# Patient Record
Sex: FEMALE | Race: WHITE | Employment: OTHER | URBAN - METROPOLITAN AREA
[De-identification: names, ages, dates, MRNs, and addresses within clinical notes are randomized per-mention and may not be internally consistent; named-entity substitution may affect disease eponyms.]

---

## 2017-01-26 DIAGNOSIS — I10 ESSENTIAL HYPERTENSION: ICD-10-CM

## 2017-01-26 RX ORDER — AMLODIPINE BESYLATE 5 MG/1
TABLET ORAL
Qty: 180 TABLET | Refills: 2 | Status: SHIPPED | OUTPATIENT
Start: 2017-01-26

## 2017-02-24 RX ORDER — OMEPRAZOLE 20 MG/1
CAPSULE, DELAYED RELEASE ORAL
Qty: 90 CAPSULE | Refills: 2 | Status: SHIPPED | OUTPATIENT
Start: 2017-02-24

## 2017-02-24 RX ORDER — HYDROCHLOROTHIAZIDE 25 MG/1
TABLET ORAL
Qty: 90 TABLET | Refills: 2 | Status: SHIPPED | OUTPATIENT
Start: 2017-02-24

## 2018-10-11 ENCOUNTER — OFFICE VISIT (OUTPATIENT)
Dept: ORTHOPEDIC SURGERY | Age: 83
End: 2018-10-11
Payer: MEDICARE

## 2018-10-11 DIAGNOSIS — Z96.651 STATUS POST TOTAL RIGHT KNEE REPLACEMENT: ICD-10-CM

## 2018-10-11 DIAGNOSIS — M25.561 ACUTE PAIN OF RIGHT KNEE: Primary | ICD-10-CM

## 2018-10-11 PROCEDURE — 1090F PRES/ABSN URINE INCON ASSESS: CPT | Performed by: PHYSICIAN ASSISTANT

## 2018-10-11 PROCEDURE — 1101F PT FALLS ASSESS-DOCD LE1/YR: CPT | Performed by: PHYSICIAN ASSISTANT

## 2018-10-11 PROCEDURE — G8419 CALC BMI OUT NRM PARAM NOF/U: HCPCS | Performed by: PHYSICIAN ASSISTANT

## 2018-10-11 PROCEDURE — 1123F ACP DISCUSS/DSCN MKR DOCD: CPT | Performed by: PHYSICIAN ASSISTANT

## 2018-10-11 PROCEDURE — 99213 OFFICE O/P EST LOW 20 MIN: CPT | Performed by: PHYSICIAN ASSISTANT

## 2018-10-11 PROCEDURE — G8484 FLU IMMUNIZE NO ADMIN: HCPCS | Performed by: PHYSICIAN ASSISTANT

## 2018-10-11 PROCEDURE — 4040F PNEUMOC VAC/ADMIN/RCVD: CPT | Performed by: PHYSICIAN ASSISTANT

## 2018-10-11 PROCEDURE — 1036F TOBACCO NON-USER: CPT | Performed by: PHYSICIAN ASSISTANT

## 2018-10-11 PROCEDURE — G8427 DOCREV CUR MEDS BY ELIG CLIN: HCPCS | Performed by: PHYSICIAN ASSISTANT

## 2018-10-16 VITALS
HEIGHT: 61 IN | DIASTOLIC BLOOD PRESSURE: 77 MMHG | TEMPERATURE: 98.1 F | BODY MASS INDEX: 27.56 KG/M2 | RESPIRATION RATE: 16 BRPM | WEIGHT: 146 LBS | SYSTOLIC BLOOD PRESSURE: 164 MMHG | HEART RATE: 60 BPM

## 2021-03-25 ENCOUNTER — OFFICE VISIT (OUTPATIENT)
Dept: ORTHOPEDIC SURGERY | Age: 86
End: 2021-03-25
Payer: MEDICARE

## 2021-03-25 ENCOUNTER — TELEPHONE (OUTPATIENT)
Dept: ORTHOPEDIC SURGERY | Age: 86
End: 2021-03-25

## 2021-03-25 VITALS — TEMPERATURE: 97 F | HEIGHT: 61 IN | BODY MASS INDEX: 26.81 KG/M2 | WEIGHT: 142 LBS

## 2021-03-25 DIAGNOSIS — S82.031A CLOSED DISPLACED TRANSVERSE FRACTURE OF RIGHT PATELLA, INITIAL ENCOUNTER: ICD-10-CM

## 2021-03-25 DIAGNOSIS — Z96.651 STATUS POST TOTAL RIGHT KNEE REPLACEMENT: Primary | ICD-10-CM

## 2021-03-25 DIAGNOSIS — Z96.642 H/O TOTAL HIP ARTHROPLASTY, LEFT: ICD-10-CM

## 2021-03-25 PROCEDURE — 99213 OFFICE O/P EST LOW 20 MIN: CPT | Performed by: ORTHOPAEDIC SURGERY

## 2021-03-25 PROCEDURE — G8417 CALC BMI ABV UP PARAM F/U: HCPCS | Performed by: ORTHOPAEDIC SURGERY

## 2021-03-25 PROCEDURE — L1832 KO ADJ JNT POS R SUP PRE CST: HCPCS | Performed by: ORTHOPAEDIC SURGERY

## 2021-03-25 PROCEDURE — 4040F PNEUMOC VAC/ADMIN/RCVD: CPT | Performed by: ORTHOPAEDIC SURGERY

## 2021-03-25 PROCEDURE — G8484 FLU IMMUNIZE NO ADMIN: HCPCS | Performed by: ORTHOPAEDIC SURGERY

## 2021-03-25 PROCEDURE — 1123F ACP DISCUSS/DSCN MKR DOCD: CPT | Performed by: ORTHOPAEDIC SURGERY

## 2021-03-25 PROCEDURE — 27520 TREAT KNEECAP FRACTURE: CPT | Performed by: ORTHOPAEDIC SURGERY

## 2021-03-25 PROCEDURE — 1090F PRES/ABSN URINE INCON ASSESS: CPT | Performed by: ORTHOPAEDIC SURGERY

## 2021-03-25 PROCEDURE — 1036F TOBACCO NON-USER: CPT | Performed by: ORTHOPAEDIC SURGERY

## 2021-03-25 PROCEDURE — G8427 DOCREV CUR MEDS BY ELIG CLIN: HCPCS | Performed by: ORTHOPAEDIC SURGERY

## 2021-03-25 NOTE — PROGRESS NOTES
[facility]    Chief Complaint: Right knee pain following right total knee arthroplasty, history of left total hip arthroplasty    HPI:  Mahad Solano is a 80 y.o. who is here for evaluation of acute right knee pain. She has a history of right total knee arthroplasty with Dr. Eileen Florian in October 2013. She also has a history of left total hip arthroplasty with Dr. Eileen Florian in November 2012. She was sitting on the toilet and flexed her knee back to get up when she heard a pop followed by significant pain in her anterior knee. This occurred 2 days ago. Pain is improving. She walks with a cane. She is here with her son today. She is not having any issues with her left hip other than occasional soreness. She had not been having any issues with the right knee until this occurred. Patient Active Problem List   Diagnosis    Abrasion or friction burn of other, multiple, and unspecified sites, without mention of infection    Enthesopathy of hip region    Syncope    Osteoarthritis of left hip    HTN (hypertension)    IBS (irritable bowel syndrome)    Joint replaced    Pulmonary embolism ([facility] [address]    Conductive hearing loss of both ears    Impacted cerumen of both ears    Hyperlipidemia       ROS:  Constitutional: denies fever, chills, weight loss  MSK: denies pain in other joints, muscle aches  Neurological: denies numbness, tingling, weakness    Exam:  Temperature 97 °F (36.1 °C), temperature source Temporal, height 5' 1\" (1.549 m), weight 142 lb (64.4 kg), not currently breastfeeding. Appearance: sitting in exam room chair, appears to be in no acute distress, awake and alert  Resp: unlabored breathing on room air  Skin: warm, dry and intact with out erythema or significant increased temperature  Neuro: grossly intact both lower extremities. Intact sensation to light touch. Motor exam 4+ to 5/5 in all major motor groups.   Left hip: Examination demonstrates negative logroll and negative Stinchfield. There is brisk capillary refill. There are 2+ dorsalis pedis and posterior tibial pulses. Strength is 5/5 in hamstrings, quads, hip flexors. Right knee: Examination demonstrates no gross deformity. Skin is unremarkable. Tender over patella. Range of motion 0 to 115 degrees. The knee is stable to varus and valgus stress and stable to anterior and posterior drawer sign. Sensation is intact light touch. There is brisk capillary refill. Dorsalis pedis and posterior tibial pulses are intact. There is 5/5 muscle strength in all muscle groups. Imaging:  AP pelvis, AP and frog-leg lateral views of the left hip were performed and interpreted today. There is a total hip arthroplasty prosthesis in place with no signs of osteolysis, loosening, fracture, dislocation. 2 views of the right knee were performed and interpreted today. There is a total knee arthroplasty prosthesis in place with no signs of osteolysis, loosening, dislocation. There is a transverse patella fracture with mild displacement anteriorly. Assessment:  History of left total hip arthroplasty  History of right total knee arthroplasty with acute patella fracture    Plan:  We discussed the diagnosis and treatment options. She has an acute patella fracture in the right knee but her extensor mechanism is intact. I recommended nonoperative management of this fracture. She was provided with a hinged knee brace to weight-bear as tolerated with the brace locked in full extension today. I plan to do this for 4 to 6 weeks to let her fracture heal.  I recommended strong adherence to the brace to prevent this fracture from displacing and requiring surgery. I will see her back in 2 weeks for reassessment and new x-rays at that time. Her left hip is functioning well and I will see her on an annual basis for this.         Procedures    Breg T Scope Knee Brace     Patient was prescribed a Breg T-Scope Brace.  The right knee will require stabilization / immobilization from this semi-rigid / rigid orthosis to improve their function. The orthosis will assist in protecting the affected area, provide functional support and facilitate healing. The prefabricated orthosis was modified in the following manner to provide a customizable fit for the patient at the time of delivery. 1.  Identification of appropriate positioning and alignment of anatomical landmarks. 2.  Trimming of straps and adjustment of frame to specifically fit patient. 3.  Polycentric hinge adjustment in flexion and extension. The patient was educated and fit by a healthcare professional with expert knowledge and specialization in brace application while under the direct supervision of the treating physician. Verbal and written instructions for the use of and application of this item were provided. They were instructed to contact the office immediately should the brace result in increased pain, decreased sensation, increased swelling or worsening of the condition. Total time spent on today's encounter was at least 26 minutes. This time included reviewing prior notes, radiographs, and lab results when available, reviewing history obtained by medical assistant, performing history and physical exam, reviewing tests/radiographs with the patient, counseling the patient, ordering medications or tests, documentation in the electronic health record, and coordination of care. This dictation was done with Dragon dictation and may contain mechanical errors related to translation.

## 2021-04-08 ENCOUNTER — OFFICE VISIT (OUTPATIENT)
Dept: ORTHOPEDIC SURGERY | Age: 86
End: 2021-04-08

## 2021-04-08 VITALS — HEIGHT: 61 IN | WEIGHT: 142 LBS | BODY MASS INDEX: 26.81 KG/M2

## 2021-04-08 DIAGNOSIS — S82.031D CLOSED DISPLACED TRANSVERSE FRACTURE OF RIGHT PATELLA WITH ROUTINE HEALING, SUBSEQUENT ENCOUNTER: Primary | ICD-10-CM

## 2021-04-08 PROCEDURE — 99024 POSTOP FOLLOW-UP VISIT: CPT | Performed by: ORTHOPAEDIC SURGERY

## 2021-04-08 NOTE — PROGRESS NOTES
are intact. There is 5/5 muscle strength in all muscle groups. Imagin views of the right knee were performed and interpreted today. There is a total knee arthroplasty prosthesis in place with no signs of osteolysis, loosening, dislocation. There is a transverse patella fracture with mild displacement anteriorly. There is approximately 2 to 3 mm more displacement today compared to prior radiographs. Assessment:  History of right total knee arthroplasty with acute patella fracture    Plan:  She will continue nonoperative management in the knee brace. We did discuss that there is slightly more displacement but she does continue to have an intact extensor mechanism so we will continue this plan of care. I will see her back in another 2 weeks for new x-rays and reassessment. This dictation was done with Dragon dictation and may contain mechanical errors related to translation.

## 2021-04-29 ENCOUNTER — OFFICE VISIT (OUTPATIENT)
Dept: ORTHOPEDIC SURGERY | Age: 86
End: 2021-04-29

## 2021-04-29 VITALS — HEIGHT: 61 IN | BODY MASS INDEX: 26.81 KG/M2 | WEIGHT: 142 LBS

## 2021-04-29 DIAGNOSIS — S82.031A CLOSED DISPLACED TRANSVERSE FRACTURE OF RIGHT PATELLA, INITIAL ENCOUNTER: Primary | ICD-10-CM

## 2021-04-29 PROCEDURE — 99024 POSTOP FOLLOW-UP VISIT: CPT | Performed by: PHYSICIAN ASSISTANT

## 2021-05-03 NOTE — PROGRESS NOTES
This dictation was done with Dragon dictation and may contain mechanical errors related to translation. I have today reviewed with Master Aleman the clinically relevant, past medical history, medications, allergies, family history, social history, and Review Of Systems form the patients most recent history form & I have documented any details relevant to today's presenting complaints in my history below. Ms. Prerna Ortiz self-reported past medical history, medications, allergies, family history, social history, and Review Of Systems form has been scanned into the chart under the \"Media\" tab. Subjective:  Master Aleman is a 80 y.o. who is here in follow-up for right patella fracture. The injury occurred on 3/23/2021 she had x-rays. Placed her in a range of motion brace. And kept her limited with her range of motion. She comes in today for repeat examination and x-rays. Her overall swelling in her knee is down and she can actively extend her leg from a flexing position to almost fully straight.       Patient Active Problem List   Diagnosis    Abrasion or friction burn of other, multiple, and unspecified sites, without mention of infection    Enthesopathy of hip region    Syncope    Osteoarthritis of left hip    HTN (hypertension)    IBS (irritable bowel syndrome)    Joint replaced    Pulmonary embolism ([address]    Conductive hearing loss of both ears    Impacted cerumen of both ears    Hyperlipidemia           Current Outpatient Medications on File Prior to Visit   Medication Sig Dispense Refill    omeprazole (PRILOSEC) 20 MG delayed release capsule TAKE 1 CAPSULE DAILY 90 capsule 2    hydrochlorothiazide (HYDRODIURIL) 25 MG tablet TAKE 1 TABLET DAILY 90 tablet 2    amLODIPine (NORVASC) 5 MG tablet TAKE 1 TABLET TWICE A  tablet 2    metoprolol succinate (TOPROL XL) 100 MG extended release tablet TAKE 1 TABLET TWICE A  tablet 0    butalbital-acetaminophen-caffeine-codeine approximately 20 minutes of face-to-face discussion in regards to the patient's current condition and treatment options. More than 50 % of the time was counseling and coordination of care as indicated above. At this point the fact that she functionally can extend even though the widening fragments have occurred we will look at doing surgery unless she can continue to extend her knee actively      PROCEDURE NOTE:   I did refit the brace and have her reset at 0-50.   She weight-bear as tolerated should she will continue to stretch and strengthening exercises and follow-up with us in 4 weeks      They will schedule a follow up in 4 weeks

## 2021-05-13 ENCOUNTER — OFFICE VISIT (OUTPATIENT)
Dept: ORTHOPEDIC SURGERY | Age: 86
End: 2021-05-13

## 2021-05-13 VITALS — WEIGHT: 142 LBS | BODY MASS INDEX: 26.81 KG/M2 | HEIGHT: 61 IN | RESPIRATION RATE: 16 BRPM

## 2021-05-13 DIAGNOSIS — S82.031K CLOSED DISPLACED TRANSVERSE FRACTURE OF RIGHT PATELLA WITH NONUNION, SUBSEQUENT ENCOUNTER: Primary | ICD-10-CM

## 2021-05-13 PROCEDURE — 99024 POSTOP FOLLOW-UP VISIT: CPT | Performed by: PHYSICIAN ASSISTANT

## 2021-05-13 NOTE — PROGRESS NOTES
This dictation was done with [employer] dictation and may contain mechanical errors related to translation. Resp. rate 16, height 5' 1\" (1.549 m), weight 142 lb (64.4 kg), not currently breastfeeding. This is a pleasant 77-year-old female who has a patella fracture dating back to 3/23/2021. We had treated her with a long-leg brace initially locked out and gradually increasing her motion. Her overall function has greatly improved over the last 4 to 6 weeks she is actively extending her leg with strong resistance she is walking comfortably and bends easily past 90 degrees. There is a palpable defect in the anterior knee but with good extensor strength and function with Dr. Nicholas Chatman and I discussed the x-rays and her abilities and at this point we will going to allow her to discontinue the brace and carefully return to her daily walking routines. Impression is stable extensor mechanism with a patella fracture.     Cautioned her about how the x-rays show that the bone fragments have a widened the overall mechanism is still working and further treatment will based on how she does over time

## 2021-08-19 ENCOUNTER — OFFICE VISIT (OUTPATIENT)
Dept: ORTHOPEDIC SURGERY | Age: 86
End: 2021-08-19
Payer: MEDICARE

## 2021-08-19 VITALS — BODY MASS INDEX: 26.62 KG/M2 | WEIGHT: 141 LBS | HEIGHT: 61 IN

## 2021-08-19 DIAGNOSIS — S82.031K CLOSED DISPLACED TRANSVERSE FRACTURE OF RIGHT PATELLA WITH NONUNION, SUBSEQUENT ENCOUNTER: ICD-10-CM

## 2021-08-19 DIAGNOSIS — M62.81 QUADRICEPS WEAKNESS: Primary | ICD-10-CM

## 2021-08-19 PROCEDURE — G8417 CALC BMI ABV UP PARAM F/U: HCPCS | Performed by: ORTHOPAEDIC SURGERY

## 2021-08-19 PROCEDURE — L1812 KO ELASTIC W/JOINTS PRE OTS: HCPCS | Performed by: ORTHOPAEDIC SURGERY

## 2021-08-19 PROCEDURE — 1090F PRES/ABSN URINE INCON ASSESS: CPT | Performed by: ORTHOPAEDIC SURGERY

## 2021-08-19 PROCEDURE — 1036F TOBACCO NON-USER: CPT | Performed by: ORTHOPAEDIC SURGERY

## 2021-08-19 PROCEDURE — G8427 DOCREV CUR MEDS BY ELIG CLIN: HCPCS | Performed by: ORTHOPAEDIC SURGERY

## 2021-08-19 PROCEDURE — 4040F PNEUMOC VAC/ADMIN/RCVD: CPT | Performed by: ORTHOPAEDIC SURGERY

## 2021-08-19 PROCEDURE — 1123F ACP DISCUSS/DSCN MKR DOCD: CPT | Performed by: ORTHOPAEDIC SURGERY

## 2021-08-19 PROCEDURE — 99213 OFFICE O/P EST LOW 20 MIN: CPT | Performed by: ORTHOPAEDIC SURGERY

## 2021-08-20 NOTE — PROGRESS NOTES
[facility]  Office Visit    Chief Complaint: R follow-up for right knee patella fracture in setting of TKA    HPI:  Di [place] is a 80 y.o. who is here in follow-up for her right knee patella fracture. She has been weightbearing as tolerated with a cane. She is here with her son today. Her pain ranges from 37/10. She does have trouble getting up out of a chair and on steps. She describes weakness and instability. Her son is concerned that she may fall. Patient Active Problem List   Diagnosis    Abrasion or friction burn of other, multiple, and unspecified sites, without mention of infection    Enthesopathy of hip region    Syncope    Osteoarthritis of left hip    HTN (hypertension)    IBS (irritable bowel syndrome)    Joint replaced    Pulmonary embolism ([facility] [address]    Conductive hearing loss of both ears    Impacted cerumen of both ears    Hyperlipidemia       ROS:  Constitutional: denies fever, chills, weight loss  MSK: denies pain in other joints, muscle aches  Neurological: denies numbness, tingling, weakness    Exam:  Height 5' 1\" (1.549 m), weight 141 lb (64 kg), not currently breastfeeding. Appearance: sitting in exam room chair, appears to be in no acute distress, awake and alert  Resp: unlabored breathing on room air  Skin: warm, dry and intact with out erythema or significant increased temperature  Neuro: grossly intact both lower extremities. Intact sensation to light touch. Motor exam 4+ to 5/5 in all major motor groups. Right knee: Mildly tender over patella. Tender over medial lateral knee. She is still able to perform straight leg raise without difficulty. Sensation is intact light touch. There is brisk capillary refill. Dorsalis pedis and posterior tibial pulses are intact. There is 5/5 muscle strength in all muscle groups.     Imaging:  Prior right knee radiographs were reviewed and show a transverse patella fracture with 1 to 2 cm of separation. 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 Her extensor mechanism remains intact. We discussed continued nonoperative management with a small knee brace and physical therapy to help with quadriceps weakness. We also discussed operative revision surgery which would include revision of the patella button with fixation of the patella. We discussed that the results of extensor mechanism revision in the setting of total knee arthroplasty are not reliable and that her function may not be much better than it is now. For this reason, I chose to pursue continue nonoperative management with a brace and physical therapy at this time. She will follow-up for repeat assessment in 1 to 2 months. Total time spent on today's encounter was at least 27 minutes. This time included reviewing prior notes, radiographs, and lab results when available, reviewing history obtained by medical assistant, performing history and physical exam, reviewing tests/radiographs with the patient, counseling the patient, ordering medications or tests, documentation in the electronic health record, and coordination of care. This dictation was done with Dragon dictation and may contain mechanical errors related to translation.

## 2021-09-30 ENCOUNTER — OFFICE VISIT (OUTPATIENT)
Dept: ORTHOPEDIC SURGERY | Age: 86
End: 2021-09-30
Payer: MEDICARE

## 2021-09-30 VITALS — HEIGHT: 61 IN | RESPIRATION RATE: 16 BRPM | WEIGHT: 142 LBS | BODY MASS INDEX: 26.81 KG/M2

## 2021-09-30 DIAGNOSIS — S82.031K CLOSED DISPLACED TRANSVERSE FRACTURE OF RIGHT PATELLA WITH NONUNION, SUBSEQUENT ENCOUNTER: Primary | ICD-10-CM

## 2021-09-30 PROCEDURE — 1036F TOBACCO NON-USER: CPT | Performed by: ORTHOPAEDIC SURGERY

## 2021-09-30 PROCEDURE — 4040F PNEUMOC VAC/ADMIN/RCVD: CPT | Performed by: ORTHOPAEDIC SURGERY

## 2021-09-30 PROCEDURE — G8427 DOCREV CUR MEDS BY ELIG CLIN: HCPCS | Performed by: ORTHOPAEDIC SURGERY

## 2021-09-30 PROCEDURE — 1090F PRES/ABSN URINE INCON ASSESS: CPT | Performed by: ORTHOPAEDIC SURGERY

## 2021-09-30 PROCEDURE — 99213 OFFICE O/P EST LOW 20 MIN: CPT | Performed by: ORTHOPAEDIC SURGERY

## 2021-09-30 PROCEDURE — 1123F ACP DISCUSS/DSCN MKR DOCD: CPT | Performed by: ORTHOPAEDIC SURGERY

## 2021-09-30 PROCEDURE — G8417 CALC BMI ABV UP PARAM F/U: HCPCS | Performed by: ORTHOPAEDIC SURGERY

## 2021-09-30 NOTE — PROGRESS NOTES
[facility]    Chief Complaint: R follow-up for right knee patella fracture in setting of TKA    HPI:  Isabell Ruiz is a 80 y.o. who is here in follow-up for her right knee patella fracture. She has been weightbearing as tolerated with a cane. She is here with her son today. She has been working with physical therapy. Both the patient and her son note that she has been improving. She is not having any pain. She describes less instability. Patient Active Problem List   Diagnosis    Abrasion or friction burn of other, multiple, and unspecified sites, without mention of infection    Enthesopathy of hip region    Syncope    Osteoarthritis of left hip    HTN (hypertension)    IBS (irritable bowel syndrome)    Joint replaced    Pulmonary embolism ([address]    Conductive hearing loss of both ears    Impacted cerumen of both ears    Hyperlipidemia       ROS:  Constitutional: denies fever, chills, weight loss  MSK: denies pain in other joints, muscle aches  Neurological: denies numbness, tingling, weakness    Exam:  Resp. rate 16, height 5' 1\" (1.549 m), weight 142 lb (64.4 kg), not currently breastfeeding. Appearance: sitting in exam room chair, appears to be in no acute distress, awake and alert  Resp: unlabored breathing on room air  Skin: warm, dry and intact with out erythema or significant increased temperature  Neuro: grossly intact both lower extremities. Intact sensation to light touch. Motor exam 4+ to 5/5 in all major motor groups. Right knee: Nontender over patella. She is still able to perform straight leg raise without difficulty. She has full extension. Sensation is intact light touch. There is brisk capillary refill. Imagin views of the right knee were performed and interpreted today. She has a total knee arthroplasty prosthesis in place. There is a transverse patella fracture with persistent 1 to 2 cm of separation. 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 Her extensor mechanism is intact. She walks with a cane and describes less instability since doing physical therapy. She will continue formal physical therapy and then home exercises. We again discussed that surgery is not likely to improve her functional outcome. I will see her back on an annual basis for her right knee. She was instructed to come in sooner if she begins to have more issues with the knee. Total time spent on today's encounter was at least 23 minutes. This time included reviewing prior notes, radiographs, and lab results when available, reviewing history obtained by medical assistant, performing history and physical exam, reviewing tests/radiographs with the patient, counseling the patient, ordering medications or tests, documentation in the electronic health record, and coordination of care. This dictation was done with Dragon dictation and may contain mechanical errors related to translation.